# Patient Record
Sex: MALE | Race: WHITE
[De-identification: names, ages, dates, MRNs, and addresses within clinical notes are randomized per-mention and may not be internally consistent; named-entity substitution may affect disease eponyms.]

---

## 2019-04-10 ENCOUNTER — HOSPITAL ENCOUNTER (EMERGENCY)
Dept: HOSPITAL 31 - C.ER | Age: 44
Discharge: HOME | End: 2019-04-10
Payer: COMMERCIAL

## 2019-04-10 VITALS
SYSTOLIC BLOOD PRESSURE: 133 MMHG | RESPIRATION RATE: 20 BRPM | DIASTOLIC BLOOD PRESSURE: 82 MMHG | OXYGEN SATURATION: 98 % | TEMPERATURE: 98.2 F | HEART RATE: 88 BPM

## 2019-04-10 DIAGNOSIS — M79.641: Primary | ICD-10-CM
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
## 2019-04-10 NOTE — RAD
PROCEDURE:  Right Hand Radiographs.



HISTORY:

right thumb pain



COMPARISON:

None.



TECHNIQUE:

3 views obtained.



FINDINGS:



BONES:

Normal. No fracture. 



JOINTS:

Normal. No osteoarthritic changes. 



SOFT TISSUES:

Normal. 



OTHER FINDINGS:

None.



IMPRESSION:

Normal right hand radiographs.